# Patient Record
Sex: MALE | URBAN - METROPOLITAN AREA
[De-identification: names, ages, dates, MRNs, and addresses within clinical notes are randomized per-mention and may not be internally consistent; named-entity substitution may affect disease eponyms.]

---

## 2024-02-08 ENCOUNTER — APPOINTMENT (OUTPATIENT)
Dept: URBAN - METROPOLITAN AREA CLINIC 191 | Age: 39
Setting detail: DERMATOLOGY
End: 2024-02-08

## 2024-02-08 DIAGNOSIS — L663 OTHER SPECIFIED DISEASES OF HAIR AND HAIR FOLLICLES: ICD-10-CM

## 2024-02-08 DIAGNOSIS — L56.4 POLYMORPHOUS LIGHT ERUPTION: ICD-10-CM

## 2024-02-08 DIAGNOSIS — L85.8 OTHER SPECIFIED EPIDERMAL THICKENING: ICD-10-CM

## 2024-02-08 DIAGNOSIS — L738 OTHER SPECIFIED DISEASES OF HAIR AND HAIR FOLLICLES: ICD-10-CM

## 2024-02-08 PROBLEM — L02.422 FURUNCLE OF LEFT AXILLA: Status: ACTIVE | Noted: 2024-02-08

## 2024-02-08 PROBLEM — L02.421 FURUNCLE OF RIGHT AXILLA: Status: ACTIVE | Noted: 2024-02-08

## 2024-02-08 PROCEDURE — OTHER PRESCRIPTION MEDICATION MANAGEMENT: OTHER

## 2024-02-08 PROCEDURE — OTHER OTC TREATMENT REGIMEN: OTHER

## 2024-02-08 PROCEDURE — OTHER ADDITIONAL NOTES: OTHER

## 2024-02-08 PROCEDURE — 99204 OFFICE O/P NEW MOD 45 MIN: CPT

## 2024-02-08 PROCEDURE — OTHER MIPS QUALITY: OTHER

## 2024-02-08 PROCEDURE — OTHER PRESCRIPTION: OTHER

## 2024-02-08 PROCEDURE — OTHER COUNSELING: OTHER

## 2024-02-08 RX ORDER — TRIAMCINOLONE ACETONIDE 1 MG/G
CREAM TOPICAL BID
Qty: 80 | Refills: 3 | Status: ERX | COMMUNITY
Start: 2024-02-08

## 2024-02-08 ASSESSMENT — LOCATION SIMPLE DESCRIPTION DERM
LOCATION SIMPLE: LEFT AXILLARY VAULT
LOCATION SIMPLE: RIGHT INDEX FINGER
LOCATION SIMPLE: RIGHT AXILLARY VAULT

## 2024-02-08 ASSESSMENT — LOCATION ZONE DERM
LOCATION ZONE: AXILLAE
LOCATION ZONE: FINGER

## 2024-02-08 ASSESSMENT — LOCATION DETAILED DESCRIPTION DERM
LOCATION DETAILED: RIGHT AXILLARY VAULT
LOCATION DETAILED: RIGHT MID DORSAL INDEX FINGER
LOCATION DETAILED: LEFT AXILLARY VAULT

## 2024-02-08 NOTE — PROCEDURE: ADDITIONAL NOTES
Render Risk Assessment In Note?: no
Additional Notes: Patient counseled on the following:\\n-Phototherapy treatment to harden skin ([facility]- [facility])\\n-Steroid injection before going on sravanthi vacations, injection should be done 3 days before onset of sun exposure (can not do more than 3-4 times a year)
Detail Level: Simple

## 2024-02-08 NOTE — PROCEDURE: PRESCRIPTION MEDICATION MANAGEMENT
Detail Level: Zone
Plan: Start Rx: triamcinolone acetonide 0.1 % topical cream- Apply twice daily to rash up to 2 weeks/month as needed. Do not apply to face, neck, or groin.
Render In Strict Bullet Format?: No
Plan: Apply Mupirocin 2% ointment as needed under arms

## 2024-02-08 NOTE — PROCEDURE: OTC TREATMENT REGIMEN
Patient Specific Otc Recommendations (Will Not Stick From Patient To Patient): Continue Hibiclens and Panoxyl wash daily
Detail Level: Zone

## 2024-02-08 NOTE — HPI: RASH
How Severe Is Your Rash?: moderate
Is This A New Presentation, Or A Follow-Up?: Rash
Additional History: Patient went to see his PCP and was diagnosed with folliculitis for these lesions. Patient states that the lesions are hard but never come to a head, and it has never drained. He is using  Hibiclens and Panoxyl wash to this area twice daily. He discontinued deodorant.
How Severe Is Your Rash?: severe
Additional History: Patient does not currently have a flare. However, he has seen his PCP for this rash and was told it could possibly be PMLE. He has taken Benadryl for this in the past and Heliocare but states that neither of them would work. He flares when areas of the body come into contact with the sun.

## 2024-02-08 NOTE — HPI: SKIN LESION
Is This A New Presentation, Or A Follow-Up?: Skin Lesion
Additional History: Patient believes this lesion is a wart and he would like to have it removed.

## 2024-03-11 ENCOUNTER — APPOINTMENT (OUTPATIENT)
Dept: URBAN - METROPOLITAN AREA CLINIC 191 | Age: 39
Setting detail: DERMATOLOGY
End: 2024-03-13

## 2024-03-11 DIAGNOSIS — L56.4 POLYMORPHOUS LIGHT ERUPTION: ICD-10-CM

## 2024-03-11 DIAGNOSIS — L738 OTHER SPECIFIED DISEASES OF HAIR AND HAIR FOLLICLES: ICD-10-CM

## 2024-03-11 DIAGNOSIS — L663 OTHER SPECIFIED DISEASES OF HAIR AND HAIR FOLLICLES: ICD-10-CM

## 2024-03-11 PROBLEM — L02.92 FURUNCLE, UNSPECIFIED: Status: ACTIVE | Noted: 2024-03-11

## 2024-03-11 PROCEDURE — OTHER COUNSELING: OTHER

## 2024-03-11 PROCEDURE — 96372 THER/PROPH/DIAG INJ SC/IM: CPT

## 2024-03-11 PROCEDURE — OTHER INTRAMUSCULAR KENALOG: OTHER

## 2024-03-11 PROCEDURE — 99213 OFFICE O/P EST LOW 20 MIN: CPT | Mod: 25

## 2024-03-11 PROCEDURE — OTHER PRESCRIPTION: OTHER

## 2024-03-11 PROCEDURE — OTHER PRESCRIPTION MEDICATION MANAGEMENT: OTHER

## 2024-03-11 PROCEDURE — OTHER MIPS QUALITY: OTHER

## 2024-03-11 RX ORDER — CLINDAMYCIN PHOSPHATE 10 MG/G
APPLY GEL TOPICAL BID
Qty: 60 | Refills: 3 | Status: ERX | COMMUNITY
Start: 2024-03-11

## 2024-03-11 ASSESSMENT — LOCATION SIMPLE DESCRIPTION DERM: LOCATION SIMPLE: LEFT BUTTOCK

## 2024-03-11 ASSESSMENT — LOCATION DETAILED DESCRIPTION DERM: LOCATION DETAILED: LEFT BUTTOCK

## 2024-03-11 ASSESSMENT — LOCATION ZONE DERM: LOCATION ZONE: TRUNK

## 2024-03-11 NOTE — PROCEDURE: INTRAMUSCULAR KENALOG
Ndc# (Optional): 2741-3901-87
Add Option For Additional Mediation: No
Lot # (Optional): [id number]
Concentration (Mg/Ml) Of Additional Medication: 2.5
Concentration (Mg/Ml): 40.0
Administered By (Optional): Rogelio Oneill MA
Kenalog Preparation: kenalog
Detail Level: None
Total Volume (Ccs): 1.5
Expiration Date (Optional): Jan 2025
Consent: The risks of atrophy were reviewed with the patient.

## 2024-03-11 NOTE — PROCEDURE: PRESCRIPTION MEDICATION MANAGEMENT
Render In Strict Bullet Format?: No
Plan: has had 3 small cysts over 1mo period. \\nRecommends oil free antiperspirant deodorants\\nStart clindamycin 1 % topical gel Apply twice a day to under arms for maintenance.\\nc/w panoxyl wash and hibiclens for underarms.
Detail Level: Zone

## 2024-09-24 ENCOUNTER — APPOINTMENT (OUTPATIENT)
Dept: URBAN - METROPOLITAN AREA CLINIC 191 | Age: 39
Setting detail: DERMATOLOGY
End: 2024-09-24

## 2024-09-24 DIAGNOSIS — F42.4 EXCORIATION (SKIN-PICKING) DISORDER: ICD-10-CM

## 2024-09-24 DIAGNOSIS — L56.4 POLYMORPHOUS LIGHT ERUPTION: ICD-10-CM

## 2024-09-24 DIAGNOSIS — L73.1 PSEUDOFOLLICULITIS BARBAE: ICD-10-CM

## 2024-09-24 DIAGNOSIS — L91.0 HYPERTROPHIC SCAR: ICD-10-CM

## 2024-09-24 PROCEDURE — OTHER ORDER TESTS: OTHER

## 2024-09-24 PROCEDURE — OTHER PRESCRIPTION MEDICATION MANAGEMENT: OTHER

## 2024-09-24 PROCEDURE — OTHER PRESCRIPTION: OTHER

## 2024-09-24 PROCEDURE — OTHER COUNSELING: OTHER

## 2024-09-24 PROCEDURE — 99214 OFFICE O/P EST MOD 30 MIN: CPT

## 2024-09-24 PROCEDURE — OTHER MIPS QUALITY: OTHER

## 2024-09-24 RX ORDER — HYDROCORTISONE 25 MG/G
CREAM TOPICAL BID PRN
Qty: 30 | Refills: 1 | Status: ERX | COMMUNITY
Start: 2024-09-24

## 2024-09-24 RX ORDER — TACROLIMUS 0.3 MG/G
APPLY OINTMENT TOPICAL BID PRN
Qty: 30 | Refills: 2 | Status: ERX | COMMUNITY
Start: 2024-09-24

## 2024-09-24 RX ORDER — CLINDAMYCIN PHOSPHATE 10 MG/ML
LOTION TOPICAL BID
Qty: 60 | Refills: 3 | Status: ERX | COMMUNITY
Start: 2024-09-24

## 2024-09-24 RX ORDER — LEVOCETIRIZINE DIHYDROCHLORIDE 5 MG/1
1 TABLET TABLET, FILM COATED ORAL QHS
Qty: 90 | Refills: 3 | Status: ERX | COMMUNITY
Start: 2024-09-24

## 2024-09-24 ASSESSMENT — LOCATION DETAILED DESCRIPTION DERM
LOCATION DETAILED: RIGHT POSTERIOR NECK
LOCATION DETAILED: LEFT DORSAL WRIST

## 2024-09-24 ASSESSMENT — LOCATION ZONE DERM
LOCATION ZONE: ARM
LOCATION ZONE: NECK

## 2024-09-24 ASSESSMENT — LOCATION SIMPLE DESCRIPTION DERM
LOCATION SIMPLE: POSTERIOR NECK
LOCATION SIMPLE: LEFT WRIST

## 2024-09-24 NOTE — PROCEDURE: PRESCRIPTION MEDICATION MANAGEMENT
Detail Level: Zone
Render In Strict Bullet Format?: No
Plan: Recommend mild shaving gel (Aveeno or Rosina Sensitive Skin)\\nRecommend no more than double bladed razor (Rosina Sensor)\\nShaving techniques discussed\\nClindamycin gel twice a day as needed for pustules\\nHydrocortisone 2.5% cream apply twice daily to irritation around beard area prn flares (7 days max)
Walk in
Plan: Levocertrizine 5mg take once daily at bedtime. ( sedation warning)\\nTriamcinolone 0.1% cream apply twice daily to rash on arms prn flares ( 2 week max)\\nTacrolimus ointment : apply twice daily as needed to arms ( maintenance)\\nRecommended Long sleeve shirts to avoid sun exposure\\nSunscreen was recommended\\nTests ordered to r/o Lupus and porphyria.  Lab slip given to patient
Plan: Triamcinolone cream twice daily as needed\\nCounseled re topical steroid cream and risk of thinning surrounding skin - apply only to raised, thick area of scar. Discontinue after scar has improved.\\nConsider stronger topical steroid cream or intralesional Kenalog injection if doesn't improve
Plan: Cleanse twice daily with wound wash saline\\nApply Mupirocin ointment twice daily\\nCover with bandaid or non-stick pad (Telfa) and paper/silicone tape dressing\\nCounseled patient regarding normal healing process

## 2024-09-24 NOTE — PROCEDURE: ORDER TESTS
Billing Type: Third-Party Bill
Performing Laboratory: [id number]
Bill For Surgical Tray: no
Expected Date Of Service: 09/24/2024
Lab Facility: [id number]

## 2024-12-26 ENCOUNTER — APPOINTMENT (OUTPATIENT)
Dept: URBAN - METROPOLITAN AREA CLINIC 191 | Age: 39
Setting detail: DERMATOLOGY
End: 2024-12-30

## 2024-12-26 DIAGNOSIS — L50.3 DERMATOGRAPHIC URTICARIA: ICD-10-CM

## 2024-12-26 DIAGNOSIS — L738 OTHER SPECIFIED DISEASES OF HAIR AND HAIR FOLLICLES: ICD-10-CM

## 2024-12-26 DIAGNOSIS — L81.3 CAFÉ AU LAIT SPOTS: ICD-10-CM

## 2024-12-26 DIAGNOSIS — L56.4 POLYMORPHOUS LIGHT ERUPTION: ICD-10-CM

## 2024-12-26 DIAGNOSIS — L663 OTHER SPECIFIED DISEASES OF HAIR AND HAIR FOLLICLES: ICD-10-CM

## 2024-12-26 DIAGNOSIS — L73.1 PSEUDOFOLLICULITIS BARBAE: ICD-10-CM

## 2024-12-26 DIAGNOSIS — B07.8 OTHER VIRAL WARTS: ICD-10-CM

## 2024-12-26 PROBLEM — D48.5 NEOPLASM OF UNCERTAIN BEHAVIOR OF SKIN: Status: ACTIVE | Noted: 2024-12-26

## 2024-12-26 PROBLEM — L02.92 FURUNCLE, UNSPECIFIED: Status: ACTIVE | Noted: 2024-12-26

## 2024-12-26 PROCEDURE — OTHER LIQUID NITROGEN: OTHER

## 2024-12-26 PROCEDURE — OTHER PRESCRIPTION MEDICATION MANAGEMENT: OTHER

## 2024-12-26 PROCEDURE — OTHER COUNSELING: OTHER

## 2024-12-26 PROCEDURE — OTHER MIPS QUALITY: OTHER

## 2024-12-26 PROCEDURE — 99213 OFFICE O/P EST LOW 20 MIN: CPT | Mod: 25

## 2024-12-26 PROCEDURE — OTHER TREATMENT REGIMEN: OTHER

## 2024-12-26 PROCEDURE — 17110 DESTRUCT B9 LESION 1-14: CPT

## 2024-12-26 ASSESSMENT — LOCATION ZONE DERM
LOCATION ZONE: ARM
LOCATION ZONE: TRUNK

## 2024-12-26 ASSESSMENT — LOCATION DETAILED DESCRIPTION DERM
LOCATION DETAILED: RIGHT MID-UPPER BACK
LOCATION DETAILED: LEFT DORSAL WRIST
LOCATION DETAILED: RIGHT SUPERIOR MEDIAL UPPER BACK

## 2024-12-26 ASSESSMENT — LOCATION SIMPLE DESCRIPTION DERM
LOCATION SIMPLE: LEFT WRIST
LOCATION SIMPLE: RIGHT UPPER BACK

## 2024-12-26 NOTE — PROCEDURE: PRESCRIPTION MEDICATION MANAGEMENT
Detail Level: Zone
Render In Strict Bullet Format?: No
Plan: Chlorhexidine (generic Hibiclens) - wash affected areas daily in shower (avoid eye/ear exposure)\\nClindamycin gel twice a day as needed
Plan: Recommend mild shaving gel (Aveeno or Rosina Sensitive Skin)\\nRecommend no more than double bladed razor (Rosina Sensor)\\nShaving techniques discussed\\nClindamycin gel twice a day as needed for pustules\\nHydrocortisone 2.5% cream apply twice daily to irritation around beard area prn flares (7 days max)
Plan: Triamcinolone 0.1% cream apply twice daily to rash on arms as needed for flares ( 2 week in a row max to one area; do not use on face, armpits, or groin)

## 2024-12-26 NOTE — PROCEDURE: LIQUID NITROGEN
Medical Necessity Information: It is in your best interest to select a reason for this procedure from the list below. All of these items fulfill various CMS LCD requirements except the new and changing color options.
Add 52 Modifier (Optional): no
Spray Paint Text: The liquid nitrogen was applied to the skin utilizing a spray paint frosting technique.
Medical Necessity Clause: This procedure was medically necessary because the lesions that were treated were:
Consent: The patient's consent was obtained including but not limited to risks of crusting, scabbing, blistering, scarring, darker or lighter pigmentary change, recurrence, incomplete removal and infection.
Post-Care Instructions: I reviewed with the patient in detail post-care instructions. Patient is to wear sunprotection, and avoid picking at any of the treated lesions. Pt may apply Vaseline to crusted or scabbing areas.
Show Spray Paint Technique Variable?: Yes
Detail Level: Detailed
Number Of Freeze-Thaw Cycles: 1 freeze-thaw cycle

## 2024-12-26 NOTE — PROCEDURE: TREATMENT REGIMEN
Plan: Consider biopsy if lesion persists
Detail Level: Zone
Plan: Can treat with OTC antihistamines - fexofenadine (Allegra) in morning, continue Xyzal